# Patient Record
Sex: MALE | Race: WHITE
[De-identification: names, ages, dates, MRNs, and addresses within clinical notes are randomized per-mention and may not be internally consistent; named-entity substitution may affect disease eponyms.]

---

## 2018-04-21 ENCOUNTER — HOSPITAL ENCOUNTER (EMERGENCY)
Dept: HOSPITAL 41 - JD.ED | Age: 68
Discharge: HOME | End: 2018-04-21
Payer: MEDICARE

## 2018-04-21 VITALS — DIASTOLIC BLOOD PRESSURE: 91 MMHG | SYSTOLIC BLOOD PRESSURE: 128 MMHG
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
## 2018-04-21 DIAGNOSIS — Z79.899: ICD-10-CM

## 2018-04-21 DIAGNOSIS — S30.1XXA: ICD-10-CM

## 2018-04-21 DIAGNOSIS — E03.9: ICD-10-CM

## 2018-04-21 DIAGNOSIS — S22.31XA: Primary | ICD-10-CM

## 2018-04-21 DIAGNOSIS — S30.811A: ICD-10-CM

## 2018-04-21 NOTE — EDM.PDOC
ED HPI GENERAL MEDICAL PROBLEM





- General


Chief Complaint: Back Pain or Injury


Stated Complaint: RIGHT SIDE RIB PAIN


Time Seen by Provider: 04/21/18 19:05


Source of Information: Reports: Patient


History Limitations: Reports: No Limitations





- History of Present Illness


INITIAL COMMENTS - FREE TEXT/NARRATIVE: 





This is a 67-year-old  male. He was climbing down from his tractor 

with his hands full of stools and he missed the steps and slid down to tractor 

striking his right lower back and right lower ribs on a ledge on the tractor. 

He has severe pain in the right lower ribs. There is an abrasion there and 

swelling there on that right flank. He has urinated since this occurred denies 

any redness to the urine but we will check this. He is able to breathe though 

breathing causes a sharp pain in the right lower rib area. He is able to move 

and turn but he does it in block style due to the soreness in his right ribs. 

He denies hitting his head he denies any extremity injury. Patient is up-to-

date with his Tdap.


  ** Right Lower Back


Pain Score (Numeric/FACES): 10





- Related Data


 Allergies











Allergy/AdvReac Type Severity Reaction Status Date / Time


 


No Known Allergies Allergy   Verified 11/06/16 05:24











Home Meds: 


 Home Meds





Meloxicam 7.5 mg PO DAILY PRN 07/11/15 [History]


Levothyroxine Sodium [Synthroid] 125 mcg PO DAILY 07/21/16 [History]


Hydrocodone/Acetaminophen [Hydrocodon-Acetaminophen 5-325] 1 each PO Q6H PRN #

20 tablet 04/21/18 [Rx]


Methotrexate 5 tab PO TU 04/21/18 [History]


Orphenadrine [Norflex] 100 mg PO BID PRN #20 tab.er 04/21/18 [Rx]











Past Medical History


Musculoskeletal History: Reports: Arthritis, Back Pain, Chronic, RA


Endocrine/Metabolic History: Reports: Hypothyroidism





- Past Surgical History


GI Surgical History: Reports: Colonoscopy


Musculoskeletal Surgical History: Reports: Other (See Below)


Other Musculoskeletal Surgeries/Procedures:: neck fusion C5-6-7; lower back





Social & Family History





- Tobacco Use


Smoking Status *Q: Never Smoker


Second Hand Smoke Exposure: No





- Caffeine Use


Caffeine Use: Reports: Soda





- Alcohol Use


Days Per Week of Alcohol Use: 0





- Recreational Drug Use


Recreational Drug Use: No





ED ROS GENERAL





- Review of Systems


Review Of Systems: See Below


Constitutional: Denies: Fever, Chills


HEENT: Reports: No Symptoms


Respiratory: Reports: Shortness of Breath.  Denies: Wheezing, Cough


Cardiovascular: Denies: Chest Pain


Endocrine: Reports: No Symptoms


GI/Abdominal: Reports: No Symptoms


: Reports: No Symptoms


Musculoskeletal: Reports: Other (History of arthritis)


Skin: Reports: Other (Abrasion right flank)


Neurological: Reports: No Symptoms


Psychiatric: Reports: No Symptoms


Hematologic/Lymphatic: Reports: No Symptoms


Immunologic: Reports: No Symptoms





ED EXAM,LOWER BACK PAIN/INJURY





- Physical Exam


Exam: See Below


Exam Limited By: No Limitations


General Appearance: Alert, WD/WN, No Apparent Distress


Eye Exam: Bilateral Eye: Normal Inspection


Ears: Normal External Exam


Nose: Normal Inspection


Throat/Mouth: Normal Inspection, Normal Lips, Normal Voice, No Airway Compromise


Head: Normocephalic


Neck: Supple


Respiratory/Chest: No Respiratory Distress, Lungs Clear, Normal Breath Sounds, 

Other (Right posterior lower ribs very tender on palpation but I don't feel any 

crepitus at this time)


Cardiovascular: Regular Rate, Rhythm, No Murmur


GI/Abdominal: Soft, Non-Tender


Back Exam: Decreased Range of Motion, Other (He has no abrasion in the right 

flank with some swelling underneath that abrasion, no lacerations, no left-

sided trauma)


Extremities: Normal Inspection, Normal Range of Motion.  No: Pedal Edema


Neurological: Alert, Normal Mood/Affect, Oriented x 3, Other (Stiff gait 

secondary to right flank pain)


Psychiatric: Normal Affect, Normal Mood


Skin Exam: Warm, Dry





Course





- Vital Signs


Last Recorded V/S: 


 Last Vital Signs











Temp  98.2 F   04/21/18 18:57


 


Pulse  65   04/21/18 18:57


 


Resp  20   04/21/18 18:57


 


BP  128/91 H  04/21/18 18:57


 


Pulse Ox  99   04/21/18 18:57














- Orders/Labs/Meds


Orders: 


 Active Orders 24 hr











 Category Date Time Status


 


 Ribs 2V w Chest Rt [CR] Stat Exams  04/21/18 19:27 Taken











Labs: 


 Laboratory Tests











  04/21/18 Range/Units





  20:25 


 


Urine Color  Yellow  (Yellow)  


 


Urine Appearance  Clear  (Clear)  


 


Urine pH  7.5  (5.0-8.0)  


 


Ur Specific Gravity  1.020  (1.005-1.030)  


 


Urine Protein  Trace H  (Negative)  


 


Urine Glucose (UA)  Negative  (Negative)  


 


Urine Ketones  1+ H  (Negative)  


 


Urine Occult Blood  Negative  (Negative)  


 


Urine Nitrite  Negative  (Negative)  


 


Urine Bilirubin  Negative  (Negative)  


 


Urine Urobilinogen  0.2  (0.2-1.0)  


 


Ur Leukocyte Esterase  Negative  (Negative)  


 


Urine RBC  0-5  (0-5)  /hpf


 


Urine WBC  0-5  (0-5)  /hpf


 


Ur Epithelial Cells  5-10 H  (0-5)  /hpf


 


Urine Bacteria  Rare  (FEW)  /hpf


 


Urine Mucus  Not seen  (FEW)  /hpf














- Radiology Interpretation


Free Text/Narrative:: 





X-ray of the ribs shows a 10th rib fracture or possibly a ninth rib fracture.





- Re-Assessments/Exams


Free Text/Narrative Re-Assessment/Exam: 





04/21/18 20:26


I spoke to the patient and his significant other regarding the fractured ribs. 

We are awaiting the urine results to make sure there is no blood in the urine.


04/21/18 20:26


I did discuss with the patient the natural progression of rib fractures healing 

in 6-8 weeks and that during this time he just needs to be very careful. He may 

take the meloxicam to help with it but I'll also provide something for pain and 

spasms as well. We talked about sleeping in a recliner versus a bed whenever he 

tolerates is the best to do.





Departure





- Departure


Time of Disposition: 20:47


Disposition: Home, Self-Care 01


Condition: Good


Clinical Impression: 


 Fracture of one rib, right side, initial encounter for closed fracture





Flank abrasion


Qualifiers:


 Encounter type: initial encounter Qualified Code(s): S30.811A - Abrasion of 

abdominal wall, initial encounter





Contusion of flank and back


Qualifiers:


 Encounter type: initial encounter Qualified Code(s): S30.1XXA - Contusion of 

abdominal wall, initial encounter








- Discharge Information


Prescriptions: 


Hydrocodone/Acetaminophen [Hydrocodon-Acetaminophen 5-325] 1 each PO Q6H PRN #

20 tablet


 PRN Reason: Pain


Orphenadrine [Norflex] 100 mg PO BID PRN #20 tab.er


 PRN Reason: Spasms


Instructions:  Rib Fracture, Abrasion, Easy-to-Read


Referrals: 


PCP,Not In Area [Primary Care Provider] - 


Forms:  ED Department Discharge


Additional Instructions: 


Gentle activity over the next 6-8 weeks, take the medicine for pain and spasms 

as needed, keep the abrasion clean and covered and dry but you may still shower

, watch for infection, do not wrap your ribs since you can develop pneumonia 

when you wrap them, follow up with your family doctor in one to 2 weeks for 

recheck or sooner if there are problems, return to the ER if needed





- My Orders


Last 24 Hours: 


My Active Orders





04/21/18 19:27


Ribs 2V w Chest Rt [CR] Stat 














- Assessment/Plan


Last 24 Hours: 


My Active Orders





04/21/18 19:27


Ribs 2V w Chest Rt [CR] Stat

## 2018-04-22 NOTE — CR
Chest and right ribs: Frontal view of the chest is obtained as well as

 three views of the right ribs.

 

Comparison: Previous chest x-ray of 05/07/14.

 

Heart size is normal.  Tortuous thoracic aorta is seen.  No acute 

parenchymal densities are seen.  Previous cervical spine surgery is 

noted.  Mild scoliosis is noted within the spine with degenerative 

change.  Fracture is identified within the anterior 10th rib.  Slight 

displacement is seen.  No additional rib abnormality is appreciated.

 

Impression:

1.  Right lower rib fracture.

2.  Other incidental findings.

 

Diagnostic code #3

## 2023-05-31 ENCOUNTER — HOSPITAL ENCOUNTER (EMERGENCY)
Dept: HOSPITAL 41 - JD.ED | Age: 73
Discharge: HOME | End: 2023-05-31
Payer: MEDICARE

## 2023-05-31 VITALS — HEART RATE: 71 BPM

## 2023-05-31 VITALS — DIASTOLIC BLOOD PRESSURE: 86 MMHG | SYSTOLIC BLOOD PRESSURE: 138 MMHG

## 2023-05-31 DIAGNOSIS — Z88.7: ICD-10-CM

## 2023-05-31 DIAGNOSIS — Z88.8: ICD-10-CM

## 2023-05-31 DIAGNOSIS — Z79.899: ICD-10-CM

## 2023-05-31 DIAGNOSIS — I25.2: ICD-10-CM

## 2023-05-31 DIAGNOSIS — Z79.82: ICD-10-CM

## 2023-05-31 DIAGNOSIS — E03.9: ICD-10-CM

## 2023-05-31 DIAGNOSIS — R10.13: Primary | ICD-10-CM

## 2023-05-31 LAB
ALBUMIN SERPL-MCNC: 3.7 G/DL (ref 3.4–5)
ALBUMIN/GLOB SERPL: 1.4 {RATIO} (ref 1–2)
ALP SERPL-CCNC: 64 U/L (ref 46–116)
ALT SERPL-CCNC: 30 U/L (ref 16–63)
ANION GAP SERPL CALC-SCNC: 10.9 MMOL/L (ref 5–15)
APTT PPP: 24.8 SECONDS (ref 21.7–31.4)
AST SERPL-CCNC: 24 U/L (ref 15–37)
BASOPHILS # BLD AUTO: 0.03 K/MM3 (ref 0.01–0.08)
BASOPHILS NFR BLD AUTO: 0.5 % (ref 0.1–1.2)
BILIRUB SERPL-MCNC: 1.5 MG/DL (ref 0.2–1)
BUN SERPL-MCNC: 34 MG/DL (ref 7–18)
BUN/CREAT SERPL: 34 (ref 14–18)
CALCIUM SERPL-MCNC: 8.3 MG/DL (ref 8.5–10.1)
CHLORIDE SERPL-SCNC: 109 MEQ/L (ref 98–107)
CO2 SERPL-SCNC: 26 MEQ/L (ref 21–32)
CREAT CL 24H UR+SERPL-VRATE: 58.08 ML/MIN
CREAT SERPL-MCNC: 1 MG/DL (ref 0.7–1.3)
EGFRCR SERPLBLD CKD-EPI 2021: 80 ML/MIN (ref 60–?)
EOSINOPHIL # BLD AUTO: 0.77 K/MM3 (ref 0.04–0.54)
EOSINOPHIL NFR BLD AUTO: 13.7 % (ref 0.8–7)
GLOBULIN SER-MCNC: 2.7 GM/DL
GLUCOSE SERPL-MCNC: 108 MG/DL (ref 70–99)
HCT VFR BLD AUTO: 38.6 % (ref 40.1–51)
HGB BLD-MCNC: 13 GM/DL (ref 13.7–17.5)
IMM GRANULOCYTES # BLD: 0.01 K/MM3 (ref 0–0.1)
IMM GRANULOCYTES NFR BLD: 0.2 % (ref ?–1)
INR PPP: 1.02
LYMPHOCYTES # BLD AUTO: 0.92 K/MM3 (ref 1.32–3.57)
LYMPHOCYTES NFR BLD AUTO: 16.3 % (ref 21.8–53.1)
MAGNESIUM SERPL-MCNC: 2 MG/DL (ref 1.8–2.4)
MCH RBC QN AUTO: 31.9 PG (ref 25.7–32.2)
MCHC RBC AUTO-ENTMCNC: 33.7 G/DL (ref 32.2–35.5)
MCHC RBC AUTO-ENTMCNC: 94.8 FL (ref 79–92.2)
MONOCYTES # BLD AUTO: 0.35 K/MM3 (ref 0.3–0.82)
MONOCYTES NFR BLD AUTO: 6.2 % (ref 5.3–12.2)
NEUTROPHILS # BLD AUTO: 3.56 K/MM3 (ref 1.78–5.38)
NEUTROPHILS NFR BLD AUTO: 63.1 % (ref 34–67.9)
PLATELET # BLD AUTO: 218 K/MM3 (ref 163–337)
PMV BLD AUTO: 8.4 FL (ref 9.4–12.3)
POTASSIUM SERPL-SCNC: 3.9 MEQ/L (ref 3.5–5.1)
PROT SERPL-MCNC: 6.4 G/DL (ref 6.4–8.2)
PROTHROMBIN TIME: 10.9 SECONDS (ref 9.7–12)
RBC # BLD AUTO: 4.07 M/MM3 (ref 4.63–6.08)
SODIUM SERPL-SCNC: 142 MEQ/L (ref 136–145)
TROPONIN I SERPL HS-IMP: 14 PG/ML (ref ?–76)
WBC # BLD AUTO: 5.64 K/MM3 (ref 4.23–9.07)

## 2023-05-31 PROCEDURE — 36415 COLL VENOUS BLD VENIPUNCTURE: CPT

## 2023-05-31 PROCEDURE — 80053 COMPREHEN METABOLIC PANEL: CPT

## 2023-05-31 PROCEDURE — 71045 X-RAY EXAM CHEST 1 VIEW: CPT

## 2023-05-31 PROCEDURE — 83880 ASSAY OF NATRIURETIC PEPTIDE: CPT

## 2023-05-31 PROCEDURE — 83735 ASSAY OF MAGNESIUM: CPT

## 2023-05-31 PROCEDURE — 93005 ELECTROCARDIOGRAM TRACING: CPT

## 2023-05-31 PROCEDURE — 85025 COMPLETE CBC W/AUTO DIFF WBC: CPT

## 2023-05-31 PROCEDURE — 85730 THROMBOPLASTIN TIME PARTIAL: CPT

## 2023-05-31 PROCEDURE — 99284 EMERGENCY DEPT VISIT MOD MDM: CPT

## 2023-05-31 PROCEDURE — 85610 PROTHROMBIN TIME: CPT

## 2023-05-31 PROCEDURE — 84484 ASSAY OF TROPONIN QUANT: CPT
